# Patient Record
Sex: MALE | Race: WHITE | Employment: UNEMPLOYED | URBAN - METROPOLITAN AREA
[De-identification: names, ages, dates, MRNs, and addresses within clinical notes are randomized per-mention and may not be internally consistent; named-entity substitution may affect disease eponyms.]

---

## 2017-01-01 ENCOUNTER — HOSPITAL ENCOUNTER (OUTPATIENT)
Dept: LAB | Age: 62
Discharge: HOME | End: 2017-01-26
Payer: MEDICARE

## 2017-01-01 ENCOUNTER — HOME CARE VISIT (OUTPATIENT)
Dept: HOSPICE | Facility: HOSPICE | Age: 62
End: 2017-01-01
Payer: MEDICARE

## 2017-01-01 ENCOUNTER — HOME CARE VISIT (OUTPATIENT)
Dept: SCHEDULING | Facility: HOME HEALTH | Age: 62
End: 2017-01-01
Payer: MEDICARE

## 2017-01-01 ENCOUNTER — HOSPICE ADMISSION (OUTPATIENT)
Dept: HOSPICE | Facility: HOSPICE | Age: 62
End: 2017-01-01
Payer: MEDICARE

## 2017-01-01 ENCOUNTER — HOSPITAL ENCOUNTER (INPATIENT)
Age: 62
LOS: 2 days | End: 2017-02-18
Attending: INTERNAL MEDICINE | Admitting: INTERNAL MEDICINE

## 2017-01-01 VITALS
TEMPERATURE: 97 F | DIASTOLIC BLOOD PRESSURE: 74 MMHG | SYSTOLIC BLOOD PRESSURE: 132 MMHG | HEART RATE: 70 BPM | RESPIRATION RATE: 18 BRPM

## 2017-01-01 VITALS
RESPIRATION RATE: 22 BRPM | TEMPERATURE: 100.9 F | SYSTOLIC BLOOD PRESSURE: 132 MMHG | DIASTOLIC BLOOD PRESSURE: 78 MMHG | HEART RATE: 142 BPM

## 2017-01-01 VITALS — RESPIRATION RATE: 22 BRPM | DIASTOLIC BLOOD PRESSURE: 63 MMHG | HEART RATE: 99 BPM | SYSTOLIC BLOOD PRESSURE: 98 MMHG

## 2017-01-01 VITALS
TEMPERATURE: 97 F | RESPIRATION RATE: 18 BRPM | DIASTOLIC BLOOD PRESSURE: 62 MMHG | SYSTOLIC BLOOD PRESSURE: 100 MMHG | HEART RATE: 140 BPM

## 2017-01-01 VITALS
HEART RATE: 130 BPM | SYSTOLIC BLOOD PRESSURE: 93 MMHG | DIASTOLIC BLOOD PRESSURE: 65 MMHG | RESPIRATION RATE: 23 BRPM | TEMPERATURE: 101.4 F

## 2017-01-01 VITALS — DIASTOLIC BLOOD PRESSURE: 64 MMHG | HEART RATE: 84 BPM | SYSTOLIC BLOOD PRESSURE: 124 MMHG

## 2017-01-01 DIAGNOSIS — G89.3 PAIN FROM BONE METASTASES (HCC): ICD-10-CM

## 2017-01-01 DIAGNOSIS — R41.82 ALTERED MENTAL STATUS, UNSPECIFIED ALTERED MENTAL STATUS TYPE: ICD-10-CM

## 2017-01-01 DIAGNOSIS — C34.12 MALIGNANT NEOPLASM OF UPPER LOBE OF LEFT LUNG (HCC): ICD-10-CM

## 2017-01-01 DIAGNOSIS — R91.8 LUNG MASS: ICD-10-CM

## 2017-01-01 DIAGNOSIS — C79.51 PAIN FROM BONE METASTASES (HCC): ICD-10-CM

## 2017-01-01 LAB
ALBUMIN SERPL BCP-MCNC: 2.3 G/DL (ref 3.2–4.6)
ALBUMIN/GLOB SERPL: 0.4 {RATIO} (ref 1.2–3.5)
ALP SERPL-CCNC: 410 U/L (ref 50–136)
ALT SERPL-CCNC: 26 U/L (ref 12–65)
ANION GAP BLD CALC-SCNC: 7 MMOL/L (ref 7–16)
AST SERPL W P-5'-P-CCNC: 33 U/L (ref 15–37)
BASOPHILS # BLD AUTO: 0.1 K/UL (ref 0–0.2)
BASOPHILS # BLD: 0 % (ref 0–2)
BILIRUB SERPL-MCNC: 0.4 MG/DL (ref 0.2–1.1)
BUN SERPL-MCNC: 9 MG/DL (ref 8–23)
CALCIUM SERPL-MCNC: 10.2 MG/DL (ref 8.3–10.4)
CHLORIDE SERPL-SCNC: 104 MMOL/L (ref 98–107)
CO2 SERPL-SCNC: 31 MMOL/L (ref 23–32)
CREAT SERPL-MCNC: 0.77 MG/DL (ref 0.8–1.5)
DIFFERENTIAL METHOD BLD: ABNORMAL
EOSINOPHIL # BLD: 0.7 K/UL (ref 0–0.8)
EOSINOPHIL NFR BLD: 3 % (ref 0.5–7.8)
ERYTHROCYTE [DISTWIDTH] IN BLOOD BY AUTOMATED COUNT: 17.1 % (ref 11.9–14.6)
GLOBULIN SER CALC-MCNC: 5.6 G/DL (ref 2.3–3.5)
GLUCOSE SERPL-MCNC: 101 MG/DL (ref 65–100)
HCT VFR BLD AUTO: 41.1 % (ref 41.1–50.3)
HGB BLD-MCNC: 12.7 G/DL (ref 13.6–17.2)
LYMPHOCYTES # BLD AUTO: 13 % (ref 13–44)
LYMPHOCYTES # BLD: 2.6 K/UL (ref 0.5–4.6)
MCH RBC QN AUTO: 26.3 PG (ref 26.1–32.9)
MCHC RBC AUTO-ENTMCNC: 30.9 G/DL (ref 31.4–35)
MCV RBC AUTO: 85.3 FL (ref 79.6–97.8)
MONOCYTES # BLD: 1.8 K/UL (ref 0.1–1.3)
MONOCYTES NFR BLD AUTO: 9 % (ref 4–12)
NEUTS SEG # BLD: 15.8 K/UL (ref 1.7–8.2)
NEUTS SEG NFR BLD AUTO: 75 % (ref 43–78)
NRBC # BLD: 0 K/UL (ref 0–0.2)
PLATELET # BLD AUTO: 311 K/UL (ref 150–450)
PMV BLD AUTO: 10 FL (ref 10.8–14.1)
POTASSIUM SERPL-SCNC: 3.8 MMOL/L (ref 3.5–5.1)
PROT SERPL-MCNC: 7.9 G/DL (ref 6.3–8.2)
RBC # BLD AUTO: 4.82 M/UL (ref 4.23–5.67)
SODIUM SERPL-SCNC: 142 MMOL/L (ref 136–145)
WBC # BLD AUTO: 20.9 K/UL (ref 4.3–11.1)

## 2017-01-01 PROCEDURE — G0299 HHS/HOSPICE OF RN EA 15 MIN: HCPCS

## 2017-01-01 PROCEDURE — 80053 COMPREHEN METABOLIC PANEL: CPT | Performed by: INTERNAL MEDICINE

## 2017-01-01 PROCEDURE — 0651 HSPC ROUTINE HOME CARE

## 2017-01-01 PROCEDURE — G0155 HHCP-SVS OF CSW,EA 15 MIN: HCPCS

## 2017-01-01 PROCEDURE — 3336500001 HSPC ELECTION

## 2017-01-01 PROCEDURE — HOSPICE MEDICATION HC HH HOSPICE MEDICATION

## 2017-01-01 PROCEDURE — 85025 COMPLETE CBC W/AUTO DIFF WBC: CPT | Performed by: INTERNAL MEDICINE

## 2017-01-01 PROCEDURE — 0656 HSPC GENERAL INPATIENT

## 2017-01-01 PROCEDURE — 99222 1ST HOSP IP/OBS MODERATE 55: CPT | Performed by: INTERNAL MEDICINE

## 2017-01-01 PROCEDURE — 99343 PR HOME VST NEW PATIENT MOD-HI SEVERITY 45 MINUTES: CPT | Performed by: INTERNAL MEDICINE

## 2017-01-01 PROCEDURE — 74011250636 HC RX REV CODE- 250/636: Performed by: NURSE PRACTITIONER

## 2017-01-01 PROCEDURE — 74011000250 HC RX REV CODE- 250: Performed by: NURSE PRACTITIONER

## 2017-01-01 PROCEDURE — T4523 ADULT SIZE BRIEF/DIAPER LG: HCPCS

## 2017-01-01 PROCEDURE — 74011250637 HC RX REV CODE- 250/637: Performed by: NURSE PRACTITIONER

## 2017-01-01 PROCEDURE — 36415 COLL VENOUS BLD VENIPUNCTURE: CPT | Performed by: INTERNAL MEDICINE

## 2017-01-01 PROCEDURE — T4541 LARGE DISPOSABLE UNDERPAD: HCPCS

## 2017-01-01 PROCEDURE — A6213 FOAM DRG >16<=48 SQ IN W/BDR: HCPCS

## 2017-01-01 RX ORDER — LORAZEPAM 2 MG/ML
1 INJECTION INTRAMUSCULAR
Status: DISCONTINUED | OUTPATIENT
Start: 2017-01-01 | End: 2017-01-01

## 2017-01-01 RX ORDER — GLYCOPYRROLATE 0.2 MG/ML
0.2 INJECTION INTRAMUSCULAR; INTRAVENOUS
Status: DISCONTINUED | OUTPATIENT
Start: 2017-01-01 | End: 2017-01-01 | Stop reason: HOSPADM

## 2017-01-01 RX ORDER — DEXAMETHASONE SODIUM PHOSPHATE 4 MG/ML
4 INJECTION, SOLUTION INTRA-ARTICULAR; INTRALESIONAL; INTRAMUSCULAR; INTRAVENOUS; SOFT TISSUE EVERY 12 HOURS
Status: DISCONTINUED | OUTPATIENT
Start: 2017-01-01 | End: 2017-01-01

## 2017-01-01 RX ORDER — FACIAL-BODY WIPES
10 EACH TOPICAL AS NEEDED
Status: DISCONTINUED | OUTPATIENT
Start: 2017-01-01 | End: 2017-01-01 | Stop reason: HOSPADM

## 2017-01-01 RX ORDER — HALOPERIDOL 5 MG/ML
2 INJECTION INTRAMUSCULAR EVERY 12 HOURS
Status: DISCONTINUED | OUTPATIENT
Start: 2017-01-01 | End: 2017-01-01 | Stop reason: HOSPADM

## 2017-01-01 RX ORDER — MORPHINE SULFATE 100 MG/5ML
10 SOLUTION ORAL
Status: DISCONTINUED | OUTPATIENT
Start: 2017-01-01 | End: 2017-01-01

## 2017-01-01 RX ORDER — LORAZEPAM 1 MG/1
1 TABLET ORAL
Status: DISCONTINUED | OUTPATIENT
Start: 2017-01-01 | End: 2017-01-01 | Stop reason: HOSPADM

## 2017-01-01 RX ORDER — ACETAMINOPHEN 650 MG/1
650 SUPPOSITORY RECTAL
Status: DISCONTINUED | OUTPATIENT
Start: 2017-01-01 | End: 2017-01-01 | Stop reason: HOSPADM

## 2017-01-01 RX ORDER — IPRATROPIUM BROMIDE AND ALBUTEROL SULFATE 2.5; .5 MG/3ML; MG/3ML
3 SOLUTION RESPIRATORY (INHALATION)
Status: DISCONTINUED | OUTPATIENT
Start: 2017-01-01 | End: 2017-01-01 | Stop reason: HOSPADM

## 2017-01-01 RX ORDER — LORAZEPAM 2 MG/ML
1 INJECTION INTRAMUSCULAR
Status: DISCONTINUED | OUTPATIENT
Start: 2017-01-01 | End: 2017-01-01 | Stop reason: HOSPADM

## 2017-01-01 RX ORDER — HALOPERIDOL 5 MG/ML
2 INJECTION INTRAMUSCULAR
Status: DISCONTINUED | OUTPATIENT
Start: 2017-01-01 | End: 2017-01-01

## 2017-01-01 RX ORDER — HALOPERIDOL 5 MG/ML
2 INJECTION INTRAMUSCULAR
Status: DISCONTINUED | OUTPATIENT
Start: 2017-01-01 | End: 2017-01-01 | Stop reason: HOSPADM

## 2017-01-01 RX ORDER — FENTANYL 25 UG/1
1 PATCH TRANSDERMAL
Status: DISCONTINUED | OUTPATIENT
Start: 2017-01-01 | End: 2017-01-01 | Stop reason: HOSPADM

## 2017-01-01 RX ORDER — MORPHINE SULFATE 2 MG/ML
2 INJECTION, SOLUTION INTRAMUSCULAR; INTRAVENOUS
Status: DISCONTINUED | OUTPATIENT
Start: 2017-01-01 | End: 2017-01-01

## 2017-01-01 RX ORDER — MORPHINE SULFATE 2 MG/ML
2 INJECTION, SOLUTION INTRAMUSCULAR; INTRAVENOUS
Status: DISCONTINUED | OUTPATIENT
Start: 2017-01-01 | End: 2017-01-01 | Stop reason: HOSPADM

## 2017-01-01 RX ADMIN — HALOPERIDOL LACTATE 2 MG: 5 INJECTION, SOLUTION INTRAMUSCULAR at 22:03

## 2017-01-01 RX ADMIN — HALOPERIDOL LACTATE 2 MG: 5 INJECTION, SOLUTION INTRAMUSCULAR at 08:15

## 2017-01-01 RX ADMIN — MORPHINE SULFATE 2 MG: 2 INJECTION, SOLUTION INTRAMUSCULAR; INTRAVENOUS at 00:32

## 2017-01-01 RX ADMIN — MORPHINE SULFATE 2 MG: 2 INJECTION, SOLUTION INTRAMUSCULAR; INTRAVENOUS at 08:15

## 2017-01-01 RX ADMIN — DEXAMETHASONE SODIUM PHOSPHATE 4 MG: 4 INJECTION, SOLUTION INTRAMUSCULAR; INTRAVENOUS at 08:14

## 2017-01-01 RX ADMIN — DEXAMETHASONE SODIUM PHOSPHATE 4 MG: 4 INJECTION, SOLUTION INTRAMUSCULAR; INTRAVENOUS at 21:43

## 2017-01-01 RX ADMIN — MORPHINE SULFATE 2 MG: 2 INJECTION, SOLUTION INTRAMUSCULAR; INTRAVENOUS at 17:08

## 2017-01-01 RX ADMIN — HALOPERIDOL LACTATE 2 MG: 5 INJECTION, SOLUTION INTRAMUSCULAR at 14:11

## 2017-01-01 RX ADMIN — GLYCOPYRROLATE 0.2 MG: 0.2 INJECTION INTRAMUSCULAR; INTRAVENOUS at 00:37

## 2017-01-01 RX ADMIN — ACETAMINOPHEN 650 MG: 650 SUPPOSITORY RECTAL at 17:08

## 2017-01-01 RX ADMIN — MORPHINE SULFATE 2 MG: 2 INJECTION, SOLUTION INTRAMUSCULAR; INTRAVENOUS at 14:10

## 2017-02-16 PROBLEM — C34.90 LUNG CANCER (HCC): Status: ACTIVE | Noted: 2017-01-01

## 2017-02-16 PROBLEM — G89.3 PAIN FROM BONE METASTASES (HCC): Status: ACTIVE | Noted: 2017-01-01

## 2017-02-16 PROBLEM — R41.82 ALTERED MENTAL STATUS: Status: ACTIVE | Noted: 2017-01-01

## 2017-02-16 PROBLEM — C79.51 PAIN FROM BONE METASTASES (HCC): Status: ACTIVE | Noted: 2017-01-01

## 2017-02-16 NOTE — PROGRESS NOTES
Hospice Psychosocial Initial Assessment    2/16/2017    Flavio Youssef  1955  [id number]  [id number]    [place] Status:      Type of Assessment: Re Eval from the home program    Discipline of person completing the assessment: SW    Disease Process  Principal Problem:    Lung cancer ([address] (2/16/2017) POA: Unknown    Active Problems:    Pain from bone metastases (2/16/2017) POA: Unknown      Altered mental status (2/16/2017) POA: Unknown        Individuals participating in interview//assessment process (name and relationship): Osmany Escalera daughter    Support System  If in a relationship, name of partner/spouse? Duration of relationship:   Does the spouse/partner function as the primary caregiver? NO    Members of the household  Does the patient live alone: YES  Members of the household  Name Age Relationship Actively Involved in Care                                   Notes Wife killed herself 3 years ago was depressed. Family members/significant others not part of the household  Name Age Relationship Actively Involved in [place]  Daughter yes   Elena   Daughter yes                       Notes      Social Support System: Good social support system which includes two or less willing family members or friends    Abuse/Neglect (actual/potential risks): no    Mental Status: Other pt was asleep during visit  Does the patient or family have any concerns about the patient's mental status? No    Emotional Status  Patient: mood/affect stable and appropriate  Caregiver: mood/affect stable and appropriate    Significant Behavioral Changes Noted with members of the household other than the patient: none observed    Leisure time management/hobbies/interests: watching television    Financial/Employment Status  Current employment status: retired   Has the patient's illness/condition forced a change in his/her employment status?  NO    Has the patient's illness/condition forced a change in the employment status of the spouse or significant other? NO    Patient's annual income level:   Has the patient's income status changed as a result of health status: NO  Financial needs: NO  The patient needs the following services: Community resources  Handling finances: Total assistance unable to manage his/her own financial affairs  Financial concerns expressed by patient or spouse/significant other:   Financial/employment notes:     Goals/Plans  Goals of hospice care expressed by patient: none   Goals of hospice care expressed by spouse/significant other: comfort until he passes    Pain Management  Does the patient/family express or observed signs/symptoms of any pain/issues that need to be reported to the ? NO  If yes, what time was the  notified? End of Life Decisions/Planning: Advanced Directives and Durable Medical Power of Attorny  Status of plans for /final arrangements: Plans complete  Plans/desires/requests for final arrangements/ communicated with: Family and  home  End of life notes: pt has a plans with [facility] [phone]  Remaining life goals:     Survivor Risk Assessment  Indicate the actual and potential risks to the bereavement process: concurrent stressful events or situations observed or reported, social support systems limited and other (Pt's spouse killed herself 3 years ago she had depression and the pt and her were fighting a lot. Daughters are still having a hard time going in the home.)  Risk notes: Moderate -  High    Is spiritual well being essential to patient/caregiver? Asked and discussion occurred   Spiritual notes: Congregation    Narrative : LMSW met with the daughter Ariana Rico today. She said pt has arrangements at [facility] in [place]. The family wishes that he pass here at the house because they do not want him to die in the home.   The mother killed herself in the home and it has been 3 years and they are just now starting to be able to go in the home. Bubba Crow is on FMLA from work  LMSW will continue to visit to provide emotional support.     Peggy Stein

## 2017-02-16 NOTE — PROGRESS NOTES
Admission completed. Daughters present, given tour, blue book discussed and explain and answered any questions.  Both daughters present

## 2017-02-16 NOTE — H&P
History and Physical    Patient: Barbra Rubalcava MRN: [id number]  SSN: [id number]    YOB: 1955  Age: 57 y.o. Sex: male      Subjective:      Barbra Rubalcava is a 57 y.o. male who was found to have widespread  Lung cancer in the autumn of 2016 after presenting with a persistent cough and an abnormal chest x-ray. He declined any palliative therapy and did quite well through the end of the year. Several weeks ago he began having increasing pain, predominantly of bone and started having problems with some hallucinations, progressive AMS, progressive loss of appetite and oral intake and hallucinations. Increasing doses of medications were not helpful. The family are very adverse to having the patient die at home. His pain is clearly not been controlled with large doses of oxycodone and fentanyl. There've been no fever or other febrile septic or toxic symptoms. There have been periods of time when medications were not given for periods of 6 hours but this did not result in any awakening of the patient. Remaining of the patient's interval medical history surgical history and system review is unremarkable and unchanged from outlined in medical record and reviewed with the family on the date of admission.          Past Medical History   Diagnosis Date    Arthritis     Bladder cancer ([facility] 10+ YEARS    CAD (coronary artery disease)     Cancer (HCC)      bladder    Chronic pain      back    Hypertension     Psychiatric disorder      depression    Stroke ([facility]      tia     Past Surgical History   Procedure Laterality Date    Pr wrist arthroscop,release xvers lig       ilya hip replacement    Hx other surgical       bladder cancer    Pr cardiac surg procedure unlist       stent x 3      Family History   Problem Relation Age of Onset    Heart Disease Mother     Heart Attack Mother     Heart Disease Father     Heart Disease Brother     Heart defect Brother     Heart Attack Brother  Cancer Sister      Throat Cancer     Social History   Substance Use Topics    Smoking status: Current Every Day Smoker     Packs/day: 1.00     Years: 47.00    Smokeless tobacco: Never Used    Alcohol use No      Prior to Admission medications    Medication Sig Start Date End Date Taking? Authorizing Provider   dexamethasone (DECADRON) 2 mg tablet Take 2 mg by mouth two (2) times daily (with meals). Historical Provider   OXYCODONE HCL (OXYFAST PO) Take 20 mg by mouth as needed. concentration 40 mg per ml  give 20 mg or 0.5 ml every 3 hrs prn pain, sob    Historical Provider   OXYGEN-AIR DELIVERY SYSTEMS 2 L by Nasal route as needed. prn sob 1/31/17   Historical Provider   ALBUTEROL, BULK, 2.5 mg by Nebulization route every four (4) hours as needed (sob, wheezing). 1/31/17   Historical Provider   HYDROcodone-homatropine (HYCODAN) 5-1.5 mg/5 mL syrup Take 5 mL by mouth four (4) times daily as needed (cough). 1/31/17   Historical Provider   senna (SENNA) 8.6 mg tablet Take 2 Tabs by mouth two (2) times a day. 1/31/17   Historical Provider   FLUoxetine (PROZAC) 20 mg capsule Take  by mouth daily. Historical Provider   diazepam (VALIUM) 10 mg tablet Take 10 mg by mouth every six (6) hours as needed for Anxiety. Historical Provider        Allergies   Allergen Reactions    Pcn [Penicillins] Rash    Sulfa (Sulfonamide Antibiotics) Rash       Review of Systems: The remainder of the admission historical database is as outlined in the Clinical Record. Objective:     Vitals:    02/16/17 1200   BP: (!) 134/94   Pulse: (!) 108   Resp: 20   Temp: 97.4 °F (36.3 °C)        Physical Exam:     Vital signs are stable as outlined. Skin examination reveals no areas of breakdown but there is very little subcutaneous fatty tissue. Head and neck examination reveals no jaundice. Mucosa and no cervical adenopathy.   Thoracic examination reveals diminished breath sounds on the left with no signs of consolidation or respiratory distress. Cardiovascular examination reveals a soft apical systolic murmur with no diastolic murmurs or rubs. Abdominal examination revealed no palpable masses organs or tenderness and there appears to be no ascites. Extremities reveal mild degenerative joint changes with no acute inflamed when stable joints. Peripheral vascular examination reveals no edema or calf tenderness and diminished peripheral arterial pulses without peripheral ischemic changes. Neurologic examination reveals patient to be fully responsive though opens his eyes to my voice and demonstrates no clear-cut lateralizing abnormalities.     Assessment:     Hospital Problems  Date Reviewed: 2/16/2017          Codes Class Noted POA    * (Principal)Lung cancer [facility]) ICD-10-CM: C34.90  ICD-9-CM: 162.9  2/16/2017 Unknown        Pain from bone metastases ICD-10-CM: G89.3, C79.51  ICD-9-CM: 338.3  2/16/2017 Unknown        Altered mental status ICD-10-CM: R41.82  ICD-9-CM: 780.97  2/16/2017 Unknown              Plan:     Current Facility-Administered Medications   Medication Dose Route Frequency    morphine (ROXANOL) concentrated oral syringe 10 mg  10 mg Oral Q30MIN PRN    Or    morphine (ROXANOL) concentrated oral syringe 10 mg  10 mg SubLINGual Q30MIN PRN    morphine injection 2 mg  2 mg SubCUTAneous Q20MIN PRN    Or    morphine injection 2 mg  2 mg IntraVENous Q20MIN PRN    LORazepam (ATIVAN) injection 1 mg  1 mg IntraVENous Q4H PRN    acetaminophen (TYLENOL) suppository 650 mg  650 mg Rectal Q3H PRN    LORazepam (ATIVAN) injection 1 mg  1 mg IntraVENous Q4H PRN    bisacodyl (DULCOLAX) suppository 10 mg  10 mg Rectal PRN    haloperidol lactate (HALDOL) injection 2 mg  2 mg IntraVENous Q1H PRN    albuterol-ipratropium (DUO-NEB) 2.5 MG-0.5 MG/3 ML  3 mL Nebulization Q4H PRN    glycopyrrolate (ROBINUL) injection 0.2 mg  0.2 mg SubCUTAneous Q4H PRN       I reviewed the situation with the patient, his 2 family members and the Nursing staff. Our plan is to discontinue all of his regular medications. We will put him on some Decadron in case some of this is related to cerebral edema. Pain medication and delirium medication will be provided as needed. I would anticipate his death in the next week and I would anticipate his remaining here at [place] [id number] until his death supervenes.       Signed By: Harper Sanchez MD     February 16, 2017

## 2017-02-17 NOTE — HSPC IDG VOLUNTEER NOTES
[address] Review     Status Codes I = Initiated C=Continued R=Revised RS = Resolved     I Volunteer     Goal: Hospice house volunteer (s) enhances the quality of remaining life while patient is at the hospice house. Interventions: Briseyda Huffman Volunteer (s) will provide companionship to the patient and/or family by visiting at the hospice house       . Briseyda Huffman Volunteer (s) will provide respite as needed when requested by patient and/or family. Briseyda Irvin  Volunteer will provide activities such as music, reading, pet therapy, etc. as requested. Briseyda Galindoman  Comfort bag delivered. Any other special requests or information regarding volunteer services:     No further needs identified at this time. These notes have been discussed in [facility] meeting.

## 2017-02-17 NOTE — HSPC IDG SOCIAL WORKER NOTES
Patient: Lori Aragon    Date: 02/17/17  Time: 4:03 PM    [facility]  Notes  Pt is a patient from the in home program.  Daughters do not want him to die in the family home. The mother killed herself in the home 3 years ago. The daughters are just now starting to be able to go to the house.           Signed by: Brody Machado

## 2017-02-17 NOTE — HSPC IDG MASTER NOTE
Hospice Interdisciplinary Group Collaborative  Date: 02/20/17  Time: 1:24 PM    ___________________    Patient: Baltazar Byers    ___________________    Diagnoses:  Diagnoses of Malignant neoplasm of upper lobe of left lung ([address] Pain from bone metastases, and Altered mental status, unspecified altered mental status type were pertinent to this visit. Current Medications:  No current facility-administered medications for this encounter. Current Outpatient Prescriptions:     dexamethasone (DECADRON) 2 mg tablet, Take 2 mg by mouth two (2) times daily (with meals). , Disp: , Rfl:     OXYCODONE HCL (OXYFAST PO), Take 20 mg by mouth as needed. concentration 40 mg per ml give 20 mg or 0.5 ml every 3 hrs prn pain, sob, Disp: , Rfl:     OXYGEN-AIR DELIVERY SYSTEMS, 2 L by Nasal route as needed. prn sob, Disp: , Rfl:     ALBUTEROL, BULK,, 2.5 mg by Nebulization route every four (4) hours as needed (sob, wheezing). , Disp: , Rfl:     HYDROcodone-homatropine (HYCODAN) 5-1.5 mg/5 mL syrup, Take 5 mL by mouth four (4) times daily as needed (cough). , Disp: , Rfl:     senna (SENNA) 8.6 mg tablet, Take 2 Tabs by mouth two (2) times a day., Disp: , Rfl:     FLUoxetine (PROZAC) 20 mg capsule, Take  by mouth daily. , Disp: , Rfl:     diazepam (VALIUM) 10 mg tablet, Take 10 mg by mouth every six (6) hours as needed for Anxiety. , Disp: , Rfl:     Orders:  Orders Placed This Encounter    DISCONTD: morphine (ROXANOL) concentrated oral syringe 10 mg    DISCONTD: morphine (ROXANOL) concentrated oral syringe 10 mg    DISCONTD: morphine injection 2 mg    DISCONTD: morphine injection 2 mg    DISCONTD: LORazepam (ATIVAN) injection 1 mg    DISCONTD: acetaminophen (TYLENOL) suppository 650 mg    DISCONTD: LORazepam (ATIVAN) injection 1 mg    DISCONTD: bisacodyl (DULCOLAX) suppository 10 mg    DISCONTD: haloperidol lactate (HALDOL) injection 2 mg    DISCONTD: albuterol-ipratropium (DUO-NEB) 2.5 MG-0.5 MG/3 ML     Order Specific Question:   MODE OF DELIVERY     Answer:   Nebulizer    DISCONTD: glycopyrrolate (ROBINUL) injection 0.2 mg    DISCONTD: dexamethasone (DECADRON) 4 mg/mL injection 4 mg    DISCONTD: dexamethasone (DECADRON) 4 mg/mL injection 4 mg    DISCONTD: haloperidol lactate (HALDOL) injection 2 mg    DISCONTD: LORazepam (ATIVAN) injection 1 mg    DISCONTD: LORazepam (ATIVAN) tablet 1 mg    DISCONTD: fentaNYL (DURAGESIC) 25 mcg/hr patch 1 Patch    DISCONTD: haloperidol lactate (HALDOL) injection 2 mg    INITIAL PHYSICIAN ORDER: HOSPICE Level Of Care: General; Reason for Admission: GIP from in home program for dx of lung cancer for management of pain, restlessness, and dyspnea. Standing Status:   Standing     Number of Occurrences:   1     Order Specific Question:   Status     Answer:   Hospice     Order Specific Question:   Level Of Care     Answer:   General     Order Specific Question:   Reason for Admission     Answer:   GIP from in home program for dx of lung cancer for management of pain, restlessness, and dyspnea. Order Specific Question:   Inpatient Hospitalization Certified Necessary for the Following Reasons     Answer:   3. Patient receiving treatment that can only be provided in an inpatient setting (further clarification in H&P documentation)     Order Specific Question:   Admitting Diagnosis     Answer:   Lung cancer [facility]) [[id number]]     Order Specific Question:   Terminal Prognosis Diagnosis(es)     Answer:   Pain [[id number]]     Order Specific Question:   Terminal Prognosis Diagnosis(es)     Answer:   Dyspnea [[id number]]     Order Specific Question:   Admitting Physician     Answer:   Jes Left     Order Specific Question:   Attending Physician     Answer:   Jes Left     Order Specific Question:   Discharge Plan:     Answer: Other (Specify)     Comments:   Anticipate demise at [facility]. Allergies:   Allergies   Allergen Reactions    Pcn [Penicillins] Rash    Sulfa (Sulfonamide Antibiotics) Rash       ___________________    Care Team Notes          POC/IDG Notes      [facility] IDG  Notes by Tico Lozano at 02/17/17 1612  Version 1 of 1    Author:  Tico Lozano Service:  Spiritual Care Author Type:  Pastoral Care    Filed:  02/17/17 1618 Date of Service:  02/17/17 1612 Status:  Signed    :  Tico Lozano (Pastoral Care)           Patient: Maye Sales    Date: 02/17/17  Time: 4:12 PM    [facility]  Notes     Gerri Malcolm is providing spiritual support in the home setting. [address] [place] [address] to provide care while Mr. Kendall is inpatient. Referral to bereavement for pre bereavement for daughters  due to recent history of complicated grief after mom's suicide.          Signed by: Tico Lozano       [facility] IDG Nurse Notes by Jorge Schmid at 02/17/17 1607  Version 1 of 1    Author:  Jorge Schmid Service:  Ariadna Alcantara Author Type:  Registered Nurse    Filed:  02/17/17 1616 Date of Service:  02/17/17 1607 Status:  Signed    :  Jorge Schmid (Registered Nurse)           Patient: Maye Sales    Date: 02/17/17  Time: 4:07 PM    [facility] Nurse Notes    1st IDG since GIP admission from [facility] home program yesterday; patient with notable decline at home and with increase in symptom management, patient's daughters were having a hard time as the wife/mother had killed herself in the home a few years ago; early bereavement initiated for children; requiring PRN morphine injectables for pain, PRN haldol injectables for agitation and PRN glyco for gurgling;     Goals of care: discontinue decadron; will add fentanyl patch 25 mcg; add scheduled haldol; hold tray; effectiveness of symptom management continuously evaluated to achieve optimum comfort       Signed by: Jorge Schmid       [facility] IDG  Notes by Jamaica Baig at 02/17/17 1603  Version 1 of 1    Author:  Jamaica Baig Service:  Ariadna Alcantara Author Type:   Filed:  02/17/17 1614 Date of Service:  02/17/17 1603 Status:  Signed    :  Alejandra Zaldivar ()           Patient: Herber Miltonkeeper    Date: 02/17/17  Time: 4:03 PM    [facility]  Notes  Pt is a patient from the in home program.  Daughters do not want him to die in the family home. The mother killed herself in the home 3 years ago. The daughters are just now starting to be able to go to the house. Signed by: Alejandra Zaldivar       [facility] IDG Volunteer Notes by Pawan Medrano at 02/17/17 1417  Version 1 of 1    Author:  Pawan Medrano Service:  Rema Tracy Author Type:  Hospice Volunteer/    Filed:  02/17/17 1417 Date of Service:  02/17/17 1417 Status:  Signed    :  Pawan Medrano (Hospice Volunteer/)               [facility] Interdisciplinary Plan of Care Review     Status Codes I = Initiated C=Continued R=Revised RS = Resolved     I Volunteer     Goal: Hospice house volunteer (s) enhances the quality of remaining life while patient is at the hospice house. Interventions: Liu Chester Pall Volunteer (s) will provide companionship to the patient and/or family by visiting at the hospice house       . Liu Schultzne Pall Volunteer (s) will provide respite as needed when requested by patient and/or family. [facility] [place] [facility]  Volunteer will provide activities such as music, reading, pet therapy, etc. as requested. [facility]  Comfort bag delivered. Any other special requests or information regarding volunteer services:     No further needs identified at this time. These notes have been discussed in [facility] meeting.

## 2017-02-17 NOTE — PROGRESS NOTES
Report received from day shift RN. Pt round completed. Pt identified by name and  via ID bracelet. Pt resting quietly in bed. Flacc 0/10. resp even and unlabored. Safety measures in place.

## 2017-02-17 NOTE — PROGRESS NOTES
Summary note. Excessive amount of secretions noted during the shift. Oropharyngeal suctioning performed x2. PRN Robinul administered as ordered. Pt attempted to exit bed x1 during the shift. Alarm sounded. Patient medicated for pain x1. Pt repositioned in bed several times during the shift

## 2017-02-17 NOTE — HSPC IDG CHAPLAIN NOTES
Patient: Herber     Date: 02/17/17  Time: 4:12 PM    [facility]  Notes     Lee Ann Rubin is providing spiritual support in the home setting. [address] [place] [address] to provide care while Mr. Kendall is inpatient. Referral to bereavement for pre bereavement for daughters  due to recent history of complicated grief after mom's suicide.          Signed by: Luke Yi

## 2017-02-17 NOTE — HSPC IDG NURSE NOTES
Patient: Rand Parks    Date: 02/17/17  Time: 4:07 PM    [facility] Nurse Notes    1st IDG since GIP admission from [facility] home program yesterday; patient with notable decline at home and with increase in symptom management, patient's daughters were having a hard time as the wife/mother had killed herself in the home a few years ago; early bereavement initiated for children; requiring PRN morphine injectables for pain, PRN haldol injectables for agitation and PRN glyco for gurgling;     Goals of care: discontinue decadron; will add fentanyl patch 25 mcg; add scheduled haldol; hold tray; effectiveness of symptom management continuously evaluated to achieve optimum comfort       Signed by: Padmini Duff

## 2017-02-18 NOTE — PROGRESS NOTES
Removed fentanyl 25mcg patch from patient right chest. Disposal witnessed by Dana Elaine.  Angelito, WITLON

## 2017-02-18 NOTE — PROGRESS NOTES
Pt assessed to have no audible heart sounds, no visual or audible respirations, un-responsive to tactile stimuli. TOD [id number]. Family at bedside.  called.

## 2017-02-19 PROCEDURE — 0656 HSPC GENERAL INPATIENT

## 2017-02-20 NOTE — HSPC IDG BEREAVEMENT NOTES
Patient death and family bereavement needs discussed at [address] [place] [address]. Bereavement risk factors indicate a bereavement risk score of LOW . Bereavement follow up to be provided accordingly.